# Patient Record
Sex: FEMALE | Race: WHITE | NOT HISPANIC OR LATINO | Employment: UNEMPLOYED | URBAN - METROPOLITAN AREA
[De-identification: names, ages, dates, MRNs, and addresses within clinical notes are randomized per-mention and may not be internally consistent; named-entity substitution may affect disease eponyms.]

---

## 2018-10-29 ENCOUNTER — APPOINTMENT (OUTPATIENT)
Dept: RADIOLOGY | Facility: CLINIC | Age: 63
End: 2018-10-29
Payer: MEDICARE

## 2018-10-29 ENCOUNTER — OFFICE VISIT (OUTPATIENT)
Dept: URGENT CARE | Facility: CLINIC | Age: 63
End: 2018-10-29
Payer: MEDICARE

## 2018-10-29 VITALS
RESPIRATION RATE: 16 BRPM | DIASTOLIC BLOOD PRESSURE: 82 MMHG | OXYGEN SATURATION: 98 % | TEMPERATURE: 101.9 F | HEIGHT: 64 IN | SYSTOLIC BLOOD PRESSURE: 138 MMHG | WEIGHT: 190.6 LBS | BODY MASS INDEX: 32.54 KG/M2 | HEART RATE: 97 BPM

## 2018-10-29 DIAGNOSIS — J20.9 ACUTE BRONCHITIS, UNSPECIFIED ORGANISM: Primary | ICD-10-CM

## 2018-10-29 DIAGNOSIS — J20.9 ACUTE BRONCHITIS, UNSPECIFIED ORGANISM: ICD-10-CM

## 2018-10-29 PROCEDURE — 99214 OFFICE O/P EST MOD 30 MIN: CPT | Performed by: PHYSICIAN ASSISTANT

## 2018-10-29 PROCEDURE — 71046 X-RAY EXAM CHEST 2 VIEWS: CPT

## 2018-10-29 RX ORDER — PREDNISONE 10 MG/1
TABLET ORAL
Qty: 21 TABLET | Refills: 0 | Status: SHIPPED | OUTPATIENT
Start: 2018-10-29

## 2018-10-29 NOTE — PROGRESS NOTES
[address]        NAME: Hedy Garcia is a 70 y o  female  : 1955    MRN: [id number]  DATE: 2018  TIME: 12:14 PM    Assessment and Plan   Acute bronchitis, unspecified organism [J20 9]  1  Acute bronchitis, unspecified organism  XR chest pa & lateral    predniSONE 10 mg tablet     Patient Instructions   Take steroid as directed with food and water  While on this medication do not take any NSAIDs including ibuprofen (Advil), naproxen (Aleve), etc   Avoid caffeinated beverages while on this medication  You may take Tylenol for fever and discomfort relief  Use a cool mist humidifier at bedtime turning on hours prior to bed with bedroom doors shut for maximum relief  Saltwater gargles, warm tea with honey, and throat lozenges for throat relief  Follow up with your family doctor in 3-5 days  Proceed to the ER if symptoms worsen  Lengthy discussion regarding treatment plan  CXR reviewed  All questions answered  Precautions given  Chief Complaint     Chief Complaint   Patient presents with    Cold Like Symptoms     Pt here ill x 4 days pt states fever 102 7,  cough,  headache  Pt used Ibuprofen  History of Present Illness   71 y/o female presenting with c/o cough x 4 days  Sx associated with fever, Tmax 102 7, relieved with ibuprofen  Cough is reportedly dry, with no known exacerbating or relieving factors  She notes cough is somewhat worse at night, but persistent throughout the day, and seems unrelated by the environment she is in  She notes she is otherwise feeling well, denying chills, sweats, fatigue, ear pain, nasal congestion, runny nose, sore throat, wheezing, SOB, or chest tightness/pain  She has not tried treating with any medications  She does note her brother, whom she lives with, was recently hospitalized for 2 weeks due to pneumonia, but she is unsure of what the causative agent was or what antibiotics he was on   She notes concern for fever severity and wants to rule out pneumonia  No other sick contacts  No recent travel  She reportedly just had the "pneumonia vaccine" and is otherwise UTD on vaccines  Review of Systems   Review of Systems   Constitutional: Negative for chills, diaphoresis and fatigue  HENT: Negative for congestion, ear pain, postnasal drip, rhinorrhea and sore throat  Respiratory: Negative for shortness of breath and wheezing  Cardiovascular: Negative for chest pain and palpitations  Gastrointestinal: Negative for abdominal pain, diarrhea, nausea and vomiting  Musculoskeletal: Negative for arthralgias and myalgias  Skin: Negative for rash  Neurological: Negative for dizziness and light-headedness  Current Medications       Current Outpatient Prescriptions:     predniSONE 10 mg tablet, Take 6 tablets day 1, 5 tablets day 2, 4 tablets day 3, 3 tablets day 4, 2 tablets day 5, 1 tablet day 6 , Disp: 21 tablet, Rfl: 0    Current Allergies     Allergies as of 10/29/2018    (No Known Allergies)          The following portions of the patient's history were reviewed and updated as appropriate: allergies, current medications, past family history, past medical history, past social history, past surgical history and problem list      History reviewed  No pertinent past medical history  History reviewed  No pertinent surgical history  Family History   Problem Relation Age of Onset    Diabetes Father      Medications have been verified  Objective   /82 (BP Location: Right arm, Patient Position: Sitting, Cuff Size: Large)   Pulse 97   Temp (!) 101 9 °F (38 8 °C) (Tympanic)   Resp 16   Ht 5' 4" (1 626 m)   Wt 86 5 kg (190 lb 9 6 oz)   SpO2 98%   BMI 32 72 kg/m²      CXR: no acute abnormality  Physical Exam     Physical Exam   Constitutional: She is oriented to person, place, and time  Vital signs are normal  She appears well-developed and well-nourished  She is cooperative  She does not appear ill  No distress     HENT: Head: Normocephalic and atraumatic  Right Ear: Hearing, tympanic membrane, external ear and ear canal normal  No middle ear effusion  Left Ear: Hearing, tympanic membrane, external ear and ear canal normal   No middle ear effusion  Nose: Nose normal  No mucosal edema or rhinorrhea  Mouth/Throat: Uvula is midline, oropharynx is clear and moist and mucous membranes are normal  Mucous membranes are not pale, not dry and not cyanotic  No oropharyngeal exudate, posterior oropharyngeal edema or posterior oropharyngeal erythema  Eyes: Conjunctivae are normal  Right eye exhibits no discharge  Left eye exhibits no discharge  No scleral icterus  Neck: Trachea normal and phonation normal  Neck supple  No spinous process tenderness and no muscular tenderness present  No neck rigidity  No edema and no erythema present  Cardiovascular: Normal rate, regular rhythm and normal heart sounds  Exam reveals no gallop and no friction rub  No murmur heard  Pulmonary/Chest: Effort normal  No stridor  No respiratory distress  She has no decreased breath sounds  She has no wheezes  She has no rhonchi  She has no rales  Coarse breath sounds in b/l bases and RML  Abdominal: Soft  Bowel sounds are normal  She exhibits no distension  There is no tenderness  There is no rigidity, no rebound and no guarding  Lymphadenopathy:     She has no cervical adenopathy  Neurological: She is alert and oriented to person, place, and time  She has normal reflexes  No cranial nerve deficit  She exhibits normal muscle tone  Coordination normal    Skin: Skin is warm and dry  No rash noted  She is not diaphoretic  Psychiatric: She has a normal mood and affect  Her behavior is normal    Nursing note and vitals reviewed

## 2018-10-29 NOTE — PATIENT INSTRUCTIONS
Take steroid as directed with food and water  While on this medication do not take any NSAIDs including ibuprofen (Advil), naproxen (Aleve), etc   Avoid caffeinated beverages while on this medication  You may take Tylenol for fever and discomfort relief  Use a cool mist humidifier at bedtime turning on hours prior to bed with bedroom doors shut for maximum relief  Saltwater gargles, warm tea with honey, and throat lozenges for throat relief  Follow up with your family doctor in 3-5 days  Proceed to the ER if symptoms worsen  Acute Bronchitis   WHAT YOU NEED TO KNOW:   Acute bronchitis is swelling and irritation in the air passages of your lungs  This irritation may cause you to cough or have other breathing problems  Acute bronchitis often starts because of another illness, such as a cold or the flu  The illness spreads from your nose and throat to your windpipe and airways  Bronchitis is often called a chest cold  Acute bronchitis lasts about 3 to 6 weeks and is usually not a serious illness  Your cough can last for several weeks  DISCHARGE INSTRUCTIONS:   Return to the emergency department if:   · You cough up blood  · Your lips or fingernails turn blue  · You feel like you are not getting enough air when you breathe  Contact your healthcare provider if:   · You have a fever  · Your breathing problems do not go away or get worse  · Your cough does not get better within 4 weeks  · You have questions or concerns about your condition or care  Self-care:   · Get more rest   Rest helps your body to heal  Slowly start to do more each day  Rest when you feel it is needed  · Avoid irritants in the air  Avoid chemicals, fumes, and dust  Wear a face mask if you must work around dust or fumes  Stay inside on days when air pollution levels are high  If you have allergies, stay inside when pollen counts are high   Do not use aerosol products, such as spray-on deodorant, bug spray, and hair spray     · Do not smoke or be around others who smoke  Nicotine and other chemicals in cigarettes and cigars damages the cilia that move mucus out of your lungs  Ask your healthcare provider for information if you currently smoke and need help to quit  E-cigarettes or smokeless tobacco still contain nicotine  Talk to your healthcare provider before you use these products  · Drink liquids as directed  Liquids help keep your air passages moist and help you cough up mucus  You may need to drink more liquids when you have acute bronchitis  Ask how much liquid to drink each day and which liquids are best for you  · Use a humidifier or vaporizer  Use a cool mist humidifier or a vaporizer to increase air moisture in your home  This may make it easier for you to breathe and help decrease your cough  Decrease risk for acute bronchitis:   · Get the vaccinations you need  Ask your healthcare provider if you should get vaccinated against the flu or pneumonia  · Prevent the spread of germs  You can decrease your risk of acute bronchitis and other illnesses by doing the following:     [facility] your hands often with soap and water  Carry germ-killing hand lotion or gel with you  You can use the lotion or gel to clean your hands when soap and water are not available  ¨ Do not touch your eyes, nose, or mouth unless you have washed your hands first     ¨ Always cover your mouth when you cough to prevent the spread of germs  It is best to cough into a tissue or your shirt sleeve instead of into your hand  Ask those around you cover their mouths when they cough  ¨ Try to avoid people who have a cold or the flu  If you are sick, stay away from others as much as possible  Medicines: Your healthcare provider may  give you any of the following:  · Ibuprofen or acetaminophen  are medicines that help lower your fever  They are available without a doctor's order   Ask your healthcare provider which medicine is right for you  Ask how much to take and how often to take it  Follow directions  These medicines can cause stomach bleeding if not taken correctly  Ibuprofen can cause kidney damage  Do not take ibuprofen if you have kidney disease, an ulcer, or allergies to aspirin  Acetaminophen can cause liver damage  Do not take more than 4,000 milligrams in 24 hours  · Decongestants  help loosen mucus in your lungs and make it easier to cough up  This can help you breathe easier  · Cough suppressants  decrease your urge to cough  If your cough produces mucus, do not take a cough suppressant unless your healthcare provider tells you to  Your healthcare provider may suggest that you take a cough suppressant at night so you can rest     · Inhalers  may be given  Your healthcare provider may give you one or more inhalers to help you breathe easier and cough less  An inhaler gives your medicine to open your airways  Ask your healthcare provider to show you how to use your inhaler correctly  · Take your medicine as directed  Contact your healthcare provider if you think your medicine is not helping or if you have side effects  Tell him of her if you are allergic to any medicine  Keep a list of the medicines, vitamins, and herbs you take  Include the amounts, and when and why you take them  Bring the list or the pill bottles to follow-up visits  Carry your medicine list with you in case of an emergency  Follow up with your healthcare provider as directed:  Write down questions you have so you will remember to ask them during your follow-up visits  © 2017 [address] [employer] Information is for End User's use only and may not be sold, redistributed or otherwise used for commercial purposes  All illustrations and images included in CareNotes® are the copyrighted property of [employer]  or Deni Harrison  The above information is an  only   It is not intended as medical advice for individual conditions or treatments  Talk to your doctor, nurse or pharmacist before following any medical regimen to see if it is safe and effective for you

## 2018-11-02 ENCOUNTER — TELEPHONE (OUTPATIENT)
Dept: URGENT CARE | Facility: CLINIC | Age: 63
End: 2018-11-02

## 2018-11-02 DIAGNOSIS — J06.9 ACUTE URI: Primary | ICD-10-CM

## 2018-11-02 RX ORDER — AMOXICILLIN AND CLAVULANATE POTASSIUM 875; 125 MG/1; MG/1
1 TABLET, FILM COATED ORAL EVERY 12 HOURS SCHEDULED
Qty: 14 TABLET | Refills: 0 | Status: SHIPPED | OUTPATIENT
Start: 2018-11-02 | End: 2018-11-09

## 2018-11-02 NOTE — TELEPHONE ENCOUNTER
Patient called noting that she was finding significant improvement with the use of the oral steroid prescribed  Her cough has been reportedly mild and more intermittent  She notes that she was fever free for a few days and was feeling significantly better until roughly 1-2 days ago when she developed nasal congestion, runny nose, and significant postnasal drainage  She notes that these symptoms were associated with a fever T-max 101 7  She notes her fever is somewhat resolved with use of an over-the-counter decongestant, which is also relieving of some of the nasal congestion  She is now concerned for an infection that is not responding to the steroid  Advised patient to continue using a nasal decongestant and to begin antibiotic  She was advised to take the antibiotic with food and water  While on this medication to take a probiotic  She noted that she does eat yogurt daily and does have a follow-up appointment for 4 days from today with a family physician  She has two days left of steroid treatment and will complete this  Advised her to keep appointment with PCP and to seek evaluation either in this office or in the emergency room if symptoms worsen  She was in agreement  She verbalized understanding of this treatment plan  All questions answered

## 2018-11-05 ENCOUNTER — OFFICE VISIT (OUTPATIENT)
Dept: URGENT CARE | Facility: CLINIC | Age: 63
End: 2018-11-05
Payer: MEDICARE

## 2018-11-05 VITALS
SYSTOLIC BLOOD PRESSURE: 120 MMHG | DIASTOLIC BLOOD PRESSURE: 86 MMHG | RESPIRATION RATE: 16 BRPM | TEMPERATURE: 102.8 F | BODY MASS INDEX: 30.73 KG/M2 | WEIGHT: 179 LBS | OXYGEN SATURATION: 93 % | HEART RATE: 103 BPM

## 2018-11-05 DIAGNOSIS — R50.9 FEVER, UNSPECIFIED FEVER CAUSE: Primary | ICD-10-CM

## 2018-11-05 PROCEDURE — 99213 OFFICE O/P EST LOW 20 MIN: CPT | Performed by: PHYSICIAN ASSISTANT

## 2018-11-05 NOTE — PROGRESS NOTES
[address]        NAME: Chilango Bailon is a 70 y o  female  : 1955    MRN: [id number]  DATE: 2018  TIME: 11:57 AM    Assessment and Plan   Fever, unspecified fever cause [R50 9]  1  Fever, unspecified fever cause  Transfer to other facility    CANCELED: POCT urine dip     Patient Instructions   Patient declined an ambulance transport but did agree to go to the ER  She opted to go to [facility] as she will be establishing care with primary care doctors within this network  Her brother was in agreement to drive  Precautions given  All questions answered  Chief Complaint     Chief Complaint   Patient presents with    Fever     fever, chilss and sweats, pt is warm to touch, clammy skin; head congestion, PND, scratchy throat, hoarse voice; ongoing since 10/25/18     History of Present Illness   75-year-old female presenting with complaint of worsening symptoms and persistent fever x 10 days  Patient notes that she did initiate Augmentin therapy as directed but is finding no relief of symptoms and no improvement of fever  Fever persists at temperatures between 102 and 103°  Symptoms are associated with fatigue, sensation of being off balance,  nasal congestion, sore throat, PND, dry cough, and generalized weakness  She notes concern that she has a lot of sinus pressure but no nasal drainage  She continues to treat with a decongestant as well as the antibiotic but did finish the course of steroid  She notes that she did find relief of fever and reduction of temperature while taking the steroid, however fever did increase around the 2nd to last day  She does have an appointment with a family physician to establish care for 2 days from today  She notes overall concern that fever persists at this level  She continues to deny any sick contacts other than her brother who recently was hospitalized due to pneumonia  No recent travel          Review of Systems   Review of Systems   HENT: Negative for hearing loss and tinnitus  Eyes: Negative for pain, discharge, redness, itching and visual disturbance  Respiratory: Negative for shortness of breath and wheezing  Cardiovascular: Negative for chest pain and palpitations  Gastrointestinal: Negative for abdominal pain, diarrhea, nausea and vomiting  Musculoskeletal: Negative for arthralgias and myalgias  Skin: Negative for rash  Neurological: Negative for dizziness, facial asymmetry, speech difficulty, weakness, numbness and headaches  Psychiatric/Behavioral: Negative for confusion and decreased concentration  Current Medications       Current Outpatient Prescriptions:     amoxicillin-clavulanate (AUGMENTIN) 875-125 mg per tablet, Take 1 tablet by mouth every 12 (twelve) hours for 7 days, Disp: 14 tablet, Rfl: 0    predniSONE 10 mg tablet, Take 6 tablets day 1, 5 tablets day 2, 4 tablets day 3, 3 tablets day 4, 2 tablets day 5, 1 tablet day 6  (Patient not taking: Reported on 11/5/2018 ), Disp: 21 tablet, Rfl: 0    Current Allergies     Allergies as of 11/05/2018    (No Known Allergies)            The following portions of the patient's history were reviewed and updated as appropriate: allergies, current medications, past family history, past medical history, past social history, past surgical history and problem list      Past Medical History:   Diagnosis Date    Patient denies medical problems        Past Surgical History:   Procedure Laterality Date    NO PAST SURGERIES         Family History   Problem Relation Age of Onset    Diabetes Father          Medications have been verified  Objective   /86   Pulse 103   Temp (!) 102 8 °F (39 3 °C)   Resp 16   Wt 81 2 kg (179 lb)   SpO2 93%   Breastfeeding? No   BMI 30 73 kg/m²        Physical Exam     Physical Exam   Constitutional: She is oriented to person, place, and time   Vital signs are normal  She appears well-developed and well-nourished  She is cooperative  She appears ill  No distress  HENT:   Head: Normocephalic and atraumatic  Right Ear: Hearing, external ear and ear canal normal    Left Ear: Hearing, external ear and ear canal normal    Nose: Mucosal edema (Erythematous nasal mucosa  Mucosa appears dry ) present  No rhinorrhea  Mouth/Throat: Uvula is midline, oropharynx is clear and moist and mucous membranes are normal  Mucous membranes are not pale, not dry and not cyanotic  No oral lesions  No uvula swelling  No oropharyngeal exudate, posterior oropharyngeal edema or posterior oropharyngeal erythema  Unable to visualize TMs bilaterally secondary to cerumen impaction  Eyes: Pupils are equal, round, and reactive to light  Conjunctivae, EOM and lids are normal  Right eye exhibits no discharge  Left eye exhibits no discharge  No scleral icterus  Neck: Neck supple  No neck rigidity  No edema and no erythema present  Patient sounds hoarse  Cardiovascular: Normal rate, regular rhythm and normal heart sounds  Exam reveals no gallop and no friction rub  No murmur heard  Pulmonary/Chest: Effort normal  No stridor  No respiratory distress  She has decreased breath sounds in the right upper field, the right middle field, the right lower field and the left lower field  She has no wheezes  She has no rhonchi  She has no rales  Abdominal: Soft  Bowel sounds are normal  She exhibits no distension and no mass  There is no tenderness  There is no rebound and no guarding  Lymphadenopathy:     She has no cervical adenopathy  Neurological: She is alert and oriented to person, place, and time  She has normal reflexes  No cranial nerve deficit  She exhibits normal muscle tone  Coordination normal    Skin: Skin is warm and dry  No rash noted  She is not diaphoretic  Psychiatric: She has a normal mood and affect  Her behavior is normal    Nursing note and vitals reviewed

## 2018-11-08 ENCOUNTER — TRANSITIONAL CARE MANAGEMENT (OUTPATIENT)
Dept: FAMILY MEDICINE CLINIC | Facility: CLINIC | Age: 63
End: 2018-11-08

## 2018-11-09 ENCOUNTER — OFFICE VISIT (OUTPATIENT)
Dept: FAMILY MEDICINE CLINIC | Facility: CLINIC | Age: 63
End: 2018-11-09
Payer: MEDICARE

## 2018-11-09 VITALS
HEIGHT: 63 IN | OXYGEN SATURATION: 92 % | BODY MASS INDEX: 33.98 KG/M2 | HEART RATE: 100 BPM | RESPIRATION RATE: 16 BRPM | TEMPERATURE: 98.5 F | DIASTOLIC BLOOD PRESSURE: 80 MMHG | WEIGHT: 191.8 LBS | SYSTOLIC BLOOD PRESSURE: 122 MMHG

## 2018-11-09 DIAGNOSIS — J18.9 PNEUMONIA OF RIGHT LOWER LOBE DUE TO INFECTIOUS ORGANISM: ICD-10-CM

## 2018-11-09 PROCEDURE — 99496 TRANSJ CARE MGMT HIGH F2F 7D: CPT | Performed by: FAMILY MEDICINE

## 2018-11-09 RX ORDER — FLUTICASONE FUROATE AND VILANTEROL TRIFENATATE 100; 25 UG/1; UG/1
POWDER RESPIRATORY (INHALATION)
Refills: 0 | COMMUNITY
Start: 2018-11-07

## 2018-11-09 RX ORDER — MOXIFLOXACIN HYDROCHLORIDE 400 MG/1
TABLET ORAL
Refills: 0 | COMMUNITY
Start: 2018-11-07

## 2018-11-09 NOTE — PROGRESS NOTES
Assessment/Plan:   Pneumonia resolving  Follow-up 1 week  Continue antibiotics  Continue incentive spirometer  Walk regularly  Call with any questions or concerns  Call immediately for change in symptomatology  Follow-up 1 week  Subjective:     Patient ID: Ayn Young is a 70 y o  female  This is a 68-year-old female who is following up after hospitalization for pneumonia  The patient feels she is doing much better  No fever  No shortness of breath  No cough  Review of Systems   Constitutional: Negative  HENT: Negative  Respiratory: Negative  Cardiovascular: Negative  Neurological: Negative  Objective:     Physical Exam   Constitutional: She is oriented to person, place, and time  She appears well-developed and well-nourished  HENT:   Head: Normocephalic and atraumatic  Right Ear: External ear normal    Left Ear: External ear normal    Nose: Nose normal    Mouth/Throat: Oropharynx is clear and moist  No oropharyngeal exudate  Cardiovascular: Normal rate, regular rhythm and normal heart sounds  Exam reveals no gallop and no friction rub  No murmur heard  Pulmonary/Chest: Effort normal and breath sounds normal  No respiratory distress  She has no wheezes  She has no rales  She exhibits no tenderness  Lymphadenopathy:     She has no cervical adenopathy  Neurological: She is alert and oriented to person, place, and time  No cranial nerve deficit   Coordination normal

## 2018-11-16 ENCOUNTER — OFFICE VISIT (OUTPATIENT)
Dept: FAMILY MEDICINE CLINIC | Facility: CLINIC | Age: 63
End: 2018-11-16
Payer: MEDICARE

## 2018-11-16 VITALS
SYSTOLIC BLOOD PRESSURE: 138 MMHG | RESPIRATION RATE: 20 BRPM | HEART RATE: 102 BPM | WEIGHT: 191 LBS | DIASTOLIC BLOOD PRESSURE: 80 MMHG | BODY MASS INDEX: 33.84 KG/M2 | TEMPERATURE: 97.7 F | HEIGHT: 63 IN | OXYGEN SATURATION: 96 %

## 2018-11-16 DIAGNOSIS — M17.0 PRIMARY OSTEOARTHRITIS OF BOTH KNEES: Primary | ICD-10-CM

## 2018-11-16 PROCEDURE — 99213 OFFICE O/P EST LOW 20 MIN: CPT | Performed by: FAMILY MEDICINE

## 2018-11-16 NOTE — PROGRESS NOTES
Assessment/Plan:   Pneumonia resolved  Osteoarthritis knees  Complete course of prednisone  Orthopedic referral   Call with any questions or concerns  Follow-up 3 months  Call immediately for change in symptomatology  Subjective:     Patient ID: Geraldo Rdz is a 70 y o  female  This is a 15-year-old female who is following up after being hospitalized for pneumonia  She is feeling much better  Review of Systems   Constitutional: Negative  HENT: Negative  Respiratory: Negative  Cardiovascular: Negative  Musculoskeletal: Positive for gait problem  Objective:     Physical Exam   Constitutional: She appears well-developed and well-nourished  HENT:   Head: Normocephalic and atraumatic  Cardiovascular: Normal rate, regular rhythm and normal heart sounds  Exam reveals no gallop and no friction rub  No murmur heard  Pulmonary/Chest: Effort normal and breath sounds normal  No respiratory distress  She has no wheezes  She has no rales  Musculoskeletal:   Crepitus both knees on range of motion

## 2021-04-28 ENCOUNTER — TELEPHONE (OUTPATIENT)
Dept: FAMILY MEDICINE CLINIC | Facility: CLINIC | Age: 66
End: 2021-04-28

## 2021-04-28 NOTE — LETTER
August 18, 2021      [id number]  [address] [facility]    Dear Ms Quinones      Our records indicate that you are due for a Physical at [facility]  Please call the office to schedule your annual exam at [phone]  If you have established primary care with another doctor, please contact the office to let us know so we can update your chart  We look forward to seeing you soon!       Sincerely,    Loree Wilson

## 2022-06-24 NOTE — TELEPHONE ENCOUNTER
06/24/22 12:28 PM     Thank you for your request  Your request has been received, reviewed, and the patient chart updated  The PCP has successfully been removed with a patient attribution note  This message will now be completed      Thank you  Yari Han